# Patient Record
Sex: MALE | Race: WHITE
[De-identification: names, ages, dates, MRNs, and addresses within clinical notes are randomized per-mention and may not be internally consistent; named-entity substitution may affect disease eponyms.]

---

## 2020-11-07 ENCOUNTER — HOSPITAL ENCOUNTER (INPATIENT)
Dept: HOSPITAL 8 - ED | Age: 80
LOS: 7 days | DRG: 329 | End: 2020-11-14
Attending: HOSPITALIST | Admitting: HOSPITALIST
Payer: COMMERCIAL

## 2020-11-07 VITALS — DIASTOLIC BLOOD PRESSURE: 66 MMHG | SYSTOLIC BLOOD PRESSURE: 112 MMHG

## 2020-11-07 VITALS — WEIGHT: 127.21 LBS | HEIGHT: 73 IN | BODY MASS INDEX: 16.86 KG/M2

## 2020-11-07 DIAGNOSIS — M16.10: ICD-10-CM

## 2020-11-07 DIAGNOSIS — Z85.51: ICD-10-CM

## 2020-11-07 DIAGNOSIS — F41.9: ICD-10-CM

## 2020-11-07 DIAGNOSIS — F32.9: ICD-10-CM

## 2020-11-07 DIAGNOSIS — G62.9: ICD-10-CM

## 2020-11-07 DIAGNOSIS — C78.7: ICD-10-CM

## 2020-11-07 DIAGNOSIS — Z87.440: ICD-10-CM

## 2020-11-07 DIAGNOSIS — N17.9: ICD-10-CM

## 2020-11-07 DIAGNOSIS — E87.6: ICD-10-CM

## 2020-11-07 DIAGNOSIS — F10.21: ICD-10-CM

## 2020-11-07 DIAGNOSIS — E03.9: ICD-10-CM

## 2020-11-07 DIAGNOSIS — N39.0: ICD-10-CM

## 2020-11-07 DIAGNOSIS — Z66: ICD-10-CM

## 2020-11-07 DIAGNOSIS — R64: ICD-10-CM

## 2020-11-07 DIAGNOSIS — I10: ICD-10-CM

## 2020-11-07 DIAGNOSIS — F29: ICD-10-CM

## 2020-11-07 DIAGNOSIS — Z79.01: ICD-10-CM

## 2020-11-07 DIAGNOSIS — I73.9: ICD-10-CM

## 2020-11-07 DIAGNOSIS — E78.5: ICD-10-CM

## 2020-11-07 DIAGNOSIS — Z86.718: ICD-10-CM

## 2020-11-07 DIAGNOSIS — C78.6: ICD-10-CM

## 2020-11-07 DIAGNOSIS — Z88.8: ICD-10-CM

## 2020-11-07 DIAGNOSIS — Z87.891: ICD-10-CM

## 2020-11-07 DIAGNOSIS — Z79.02: ICD-10-CM

## 2020-11-07 DIAGNOSIS — Z51.5: ICD-10-CM

## 2020-11-07 DIAGNOSIS — Z79.899: ICD-10-CM

## 2020-11-07 DIAGNOSIS — F03.90: ICD-10-CM

## 2020-11-07 DIAGNOSIS — G93.41: ICD-10-CM

## 2020-11-07 DIAGNOSIS — F10.20: ICD-10-CM

## 2020-11-07 DIAGNOSIS — F20.5: ICD-10-CM

## 2020-11-07 DIAGNOSIS — K56.609: Primary | ICD-10-CM

## 2020-11-07 LAB
ALBUMIN SERPL-MCNC: 2.9 G/DL (ref 3.4–5)
ALP SERPL-CCNC: 75 U/L (ref 45–117)
ALT SERPL-CCNC: 19 U/L (ref 12–78)
ANION GAP SERPL CALC-SCNC: 8 MMOL/L (ref 5–15)
BASOPHILS # BLD AUTO: 0 X10^3/UL (ref 0–0.1)
BASOPHILS NFR BLD AUTO: 1 % (ref 0–1)
BILIRUB SERPL-MCNC: 0.8 MG/DL (ref 0.2–1)
CALCIUM SERPL-MCNC: 9 MG/DL (ref 8.5–10.1)
CHLORIDE SERPL-SCNC: 105 MMOL/L (ref 98–107)
CREAT SERPL-MCNC: 0.98 MG/DL (ref 0.7–1.3)
EOSINOPHIL # BLD AUTO: 0 X10^3/UL (ref 0–0.4)
EOSINOPHIL NFR BLD AUTO: 1 % (ref 1–7)
ERYTHROCYTE [DISTWIDTH] IN BLOOD BY AUTOMATED COUNT: 15.9 % (ref 9.4–14.8)
INR PPP: 1.06 (ref 0.93–1.1)
LYMPHOCYTES # BLD AUTO: 0.6 X10^3/UL (ref 1–3.4)
LYMPHOCYTES NFR BLD AUTO: 10 % (ref 22–44)
MCH RBC QN AUTO: 32.9 PG (ref 27.5–34.5)
MCHC RBC AUTO-ENTMCNC: 33.6 G/DL (ref 33.2–36.2)
MD: NO
MONOCYTES # BLD AUTO: 0.6 X10^3/UL (ref 0.2–0.8)
MONOCYTES NFR BLD AUTO: 11 % (ref 2–9)
NEUTROPHILS # BLD AUTO: 4.7 X10^3/UL (ref 1.8–6.8)
NEUTROPHILS NFR BLD AUTO: 79 % (ref 42–75)
PLATELET # BLD AUTO: 351 X10^3/UL (ref 130–400)
PMV BLD AUTO: 7.8 FL (ref 7.4–10.4)
PROT SERPL-MCNC: 6.5 G/DL (ref 6.4–8.2)
PROTHROMBIN TIME: 11.2 SECONDS (ref 9.6–11.5)
RBC # BLD AUTO: 3.06 X10^6/UL (ref 4.38–5.82)
T4 FREE SERPL-MCNC: 1.59 NG/DL (ref 0.76–1.46)

## 2020-11-07 PROCEDURE — 83735 ASSAY OF MAGNESIUM: CPT

## 2020-11-07 PROCEDURE — 85025 COMPLETE CBC W/AUTO DIFF WBC: CPT

## 2020-11-07 PROCEDURE — 80053 COMPREHEN METABOLIC PANEL: CPT

## 2020-11-07 PROCEDURE — 88342 IMHCHEM/IMCYTCHM 1ST ANTB: CPT

## 2020-11-07 PROCEDURE — 83036 HEMOGLOBIN GLYCOSYLATED A1C: CPT

## 2020-11-07 PROCEDURE — 96372 THER/PROPH/DIAG INJ SC/IM: CPT

## 2020-11-07 PROCEDURE — 0DJD0ZZ INSPECTION OF LOWER INTESTINAL TRACT, OPEN APPROACH: ICD-10-PCS | Performed by: SURGERY

## 2020-11-07 PROCEDURE — 99285 EMERGENCY DEPT VISIT HI MDM: CPT

## 2020-11-07 PROCEDURE — 83690 ASSAY OF LIPASE: CPT

## 2020-11-07 PROCEDURE — 83605 ASSAY OF LACTIC ACID: CPT

## 2020-11-07 PROCEDURE — 84100 ASSAY OF PHOSPHORUS: CPT

## 2020-11-07 PROCEDURE — 0DT80ZZ RESECTION OF SMALL INTESTINE, OPEN APPROACH: ICD-10-PCS | Performed by: SURGERY

## 2020-11-07 PROCEDURE — 84439 ASSAY OF FREE THYROXINE: CPT

## 2020-11-07 PROCEDURE — 81001 URINALYSIS AUTO W/SCOPE: CPT

## 2020-11-07 PROCEDURE — C9113 INJ PANTOPRAZOLE SODIUM, VIA: HCPCS

## 2020-11-07 PROCEDURE — 80061 LIPID PANEL: CPT

## 2020-11-07 PROCEDURE — 96374 THER/PROPH/DIAG INJ IV PUSH: CPT

## 2020-11-07 PROCEDURE — 36415 COLL VENOUS BLD VENIPUNCTURE: CPT

## 2020-11-07 PROCEDURE — 88307 TISSUE EXAM BY PATHOLOGIST: CPT

## 2020-11-07 PROCEDURE — 84443 ASSAY THYROID STIM HORMONE: CPT

## 2020-11-07 PROCEDURE — 88341 IMHCHEM/IMCYTCHM EA ADD ANTB: CPT

## 2020-11-07 PROCEDURE — 87086 URINE CULTURE/COLONY COUNT: CPT

## 2020-11-07 PROCEDURE — 85610 PROTHROMBIN TIME: CPT

## 2020-11-07 RX ADMIN — INSULIN LISPRO SCH NOTE: 100 INJECTION, SOLUTION INTRAVENOUS; SUBCUTANEOUS at 20:30

## 2020-11-07 RX ADMIN — POTASSIUM CHLORIDE SCH MLS/HR: 2 INJECTION, SOLUTION, CONCENTRATE INTRAVENOUS at 19:50

## 2020-11-07 RX ADMIN — POTASSIUM CHLORIDE SCH MLS/HR: 2 INJECTION, SOLUTION, CONCENTRATE INTRAVENOUS at 18:00

## 2020-11-07 RX ADMIN — HEPARIN SODIUM SCH UNITS: 5000 INJECTION, SOLUTION INTRAVENOUS; SUBCUTANEOUS at 19:19

## 2020-11-07 RX ADMIN — PHENAZOPYRIDINE HYDROCHLORIDE SCH MG: 200 TABLET ORAL at 21:00

## 2020-11-07 NOTE — NUR
PATIENT BIB CARE FLIGHT WITH CHIEF C/O SMALL BOWEL OBSTRUCTION.  PER CARE 
FLIGHT RN PATIENT WAS D[facility] [place] [facility] TO INPATIENT [facility] AFTER 
HAVING UTI.  PATIENT STARTED HAVING INCREASING ABDOMINAL PAIN, ALOC, AND 
INCREASED AGITATION. DIAGNOSED TODAY 11/7/2020 WITH SMALL BOWEL OBSTRUCTION. 
PATIENT WAS COVID + PRIOR TO 10/25 AND HAS SINCE TESTED NEGATIVE FOR COVID X2. 
PATIENT HAS 16 [place] NG TUBE IN RIGHT NARE, 22 GAUGE IV RAC AND 20 GAUGE IV 
LFA. PER CARE FLIGHT RN PATIENT'S VITALS STABLE EN ROUTE, RECIEVED 2.5 MG 
ATIVAN EN ROUTE.

## 2020-11-08 VITALS — DIASTOLIC BLOOD PRESSURE: 66 MMHG | SYSTOLIC BLOOD PRESSURE: 101 MMHG

## 2020-11-08 VITALS — DIASTOLIC BLOOD PRESSURE: 67 MMHG | SYSTOLIC BLOOD PRESSURE: 135 MMHG

## 2020-11-08 VITALS — SYSTOLIC BLOOD PRESSURE: 131 MMHG | DIASTOLIC BLOOD PRESSURE: 69 MMHG

## 2020-11-08 VITALS — SYSTOLIC BLOOD PRESSURE: 113 MMHG | DIASTOLIC BLOOD PRESSURE: 78 MMHG

## 2020-11-08 VITALS — DIASTOLIC BLOOD PRESSURE: 57 MMHG | SYSTOLIC BLOOD PRESSURE: 99 MMHG

## 2020-11-08 LAB
<PLATELET ESTIMATE>: ADEQUATE
<PLT MORPHOLOGY>: (no result)
ALBUMIN SERPL-MCNC: 2.5 G/DL (ref 3.4–5)
ALP SERPL-CCNC: 64 U/L (ref 45–117)
ALT SERPL-CCNC: 18 U/L (ref 12–78)
ANION GAP SERPL CALC-SCNC: 8 MMOL/L (ref 5–15)
BAND#(MANUAL): 1.3 X10^3/UL
BILIRUB DIRECT SERPL-MCNC: NORMAL MG/DL
BILIRUB SERPL-MCNC: 0.4 MG/DL (ref 0.2–1)
CALCIUM SERPL-MCNC: 8.3 MG/DL (ref 8.5–10.1)
CHLORIDE SERPL-SCNC: 111 MMOL/L (ref 98–107)
CHOL/HDL RATIO: 2.2
CREAT SERPL-MCNC: 1.24 MG/DL (ref 0.7–1.3)
ERYTHROCYTE [DISTWIDTH] IN BLOOD BY AUTOMATED COUNT: 15.9 % (ref 9.4–14.8)
HDL CHOL %: 45 % (ref 26–37)
HDL CHOLESTEROL (DIRECT): 43 MG/DL (ref 40–60)
LDL CHOLESTEROL,CALCULATED: 41 MG/DL (ref 54–169)
LDLC/HDLC SERPL: 1 {RATIO} (ref 0.5–3)
LYMPH#(MANUAL): 0.12 X10^3/UL (ref 1–3.4)
LYMPHS% (MANUAL): 2 % (ref 22–44)
MCH RBC QN AUTO: 32.7 PG (ref 27.5–34.5)
MCHC RBC AUTO-ENTMCNC: 32.8 G/DL (ref 33.2–36.2)
MD: YES
MICROSCOPIC: (no result)
MONOS#(MANUAL): 0.47 X10^3/UL (ref 0.3–2.7)
MONOS% (MANUAL): 8 % (ref 2–9)
NEUTS BAND NFR BLD: 22 % (ref 0–7)
PLATELET # BLD AUTO: 319 X10^3/UL (ref 130–400)
PMV BLD AUTO: 7.9 FL (ref 7.4–10.4)
PROT SERPL-MCNC: 5.7 G/DL (ref 6.4–8.2)
RBC # BLD AUTO: 3.01 X10^6/UL (ref 4.38–5.82)
SEG#(MANUAL): 4.01 X10^3/UL (ref 1.8–6.8)
SEGS% (MANUAL): 68 % (ref 42–75)
TRIGL SERPL-MCNC: 61 MG/DL (ref 50–200)
VLDLC SERPL CALC-MCNC: 12 MG/DL (ref 0–25)

## 2020-11-08 RX ADMIN — MORPHINE SULFATE PRN MG: 10 INJECTION INTRAVENOUS at 05:43

## 2020-11-08 RX ADMIN — PANTOPRAZOLE SODIUM SCH MG: 40 INJECTION, POWDER, FOR SOLUTION INTRAVENOUS at 08:47

## 2020-11-08 RX ADMIN — MORPHINE SULFATE PRN MG: 10 INJECTION INTRAVENOUS at 21:00

## 2020-11-08 RX ADMIN — INSULIN LISPRO SCH NOTE: 100 INJECTION, SOLUTION INTRAVENOUS; SUBCUTANEOUS at 12:30

## 2020-11-08 RX ADMIN — INSULIN LISPRO SCH NOTE: 100 INJECTION, SOLUTION INTRAVENOUS; SUBCUTANEOUS at 04:30

## 2020-11-08 RX ADMIN — HEPARIN SODIUM SCH UNITS: 5000 INJECTION, SOLUTION INTRAVENOUS; SUBCUTANEOUS at 21:00

## 2020-11-08 RX ADMIN — LINEZOLID SCH MLS/HR: 600 INJECTION, SOLUTION INTRAVENOUS at 02:43

## 2020-11-08 RX ADMIN — POTASSIUM CHLORIDE SCH MLS/HR: 2 INJECTION, SOLUTION, CONCENTRATE INTRAVENOUS at 11:27

## 2020-11-08 RX ADMIN — LINEZOLID SCH MLS/HR: 600 INJECTION, SOLUTION INTRAVENOUS at 14:30

## 2020-11-08 RX ADMIN — MORPHINE SULFATE PRN MG: 10 INJECTION INTRAVENOUS at 01:00

## 2020-11-08 RX ADMIN — HEPARIN SODIUM SCH UNITS: 5000 INJECTION, SOLUTION INTRAVENOUS; SUBCUTANEOUS at 05:38

## 2020-11-08 RX ADMIN — MORPHINE SULFATE PRN MG: 10 INJECTION INTRAVENOUS at 18:08

## 2020-11-08 RX ADMIN — MORPHINE SULFATE PRN MG: 10 INJECTION INTRAVENOUS at 11:28

## 2020-11-08 RX ADMIN — POTASSIUM CHLORIDE SCH MLS/HR: 2 INJECTION, SOLUTION, CONCENTRATE INTRAVENOUS at 21:46

## 2020-11-08 RX ADMIN — MORPHINE SULFATE PRN MG: 10 INJECTION INTRAVENOUS at 01:35

## 2020-11-08 RX ADMIN — PHENAZOPYRIDINE HYDROCHLORIDE SCH MG: 200 TABLET ORAL at 08:45

## 2020-11-08 RX ADMIN — HEPARIN SODIUM SCH UNITS: 5000 INJECTION, SOLUTION INTRAVENOUS; SUBCUTANEOUS at 13:00

## 2020-11-08 RX ADMIN — MORPHINE SULFATE PRN MG: 10 INJECTION INTRAVENOUS at 08:47

## 2020-11-08 RX ADMIN — POTASSIUM CHLORIDE SCH MLS/HR: 2 INJECTION, SOLUTION, CONCENTRATE INTRAVENOUS at 02:43

## 2020-11-09 VITALS — DIASTOLIC BLOOD PRESSURE: 50 MMHG | SYSTOLIC BLOOD PRESSURE: 97 MMHG

## 2020-11-09 VITALS — SYSTOLIC BLOOD PRESSURE: 119 MMHG | DIASTOLIC BLOOD PRESSURE: 60 MMHG

## 2020-11-09 VITALS — SYSTOLIC BLOOD PRESSURE: 120 MMHG | DIASTOLIC BLOOD PRESSURE: 58 MMHG

## 2020-11-09 LAB
<PLATELET ESTIMATE>: ADEQUATE
<PLT MORPHOLOGY>: (no result)
ALBUMIN SERPL-MCNC: 2.3 G/DL (ref 3.4–5)
ALP SERPL-CCNC: 60 U/L (ref 45–117)
ALT SERPL-CCNC: 18 U/L (ref 12–78)
ANION GAP SERPL CALC-SCNC: 4 MMOL/L (ref 5–15)
ANISOCYTOSIS BLD QL SMEAR: (no result)
BAND#(MANUAL): 0.67 X10^3/UL
BILIRUB SERPL-MCNC: 0.3 MG/DL (ref 0.2–1)
CALCIUM SERPL-MCNC: 8.6 MG/DL (ref 8.5–10.1)
CHLORIDE SERPL-SCNC: 117 MMOL/L (ref 98–107)
CREAT SERPL-MCNC: 1 MG/DL (ref 0.7–1.3)
ERYTHROCYTE [DISTWIDTH] IN BLOOD BY AUTOMATED COUNT: 15.9 % (ref 9.4–14.8)
LYMPH#(MANUAL): 0.37 X10^3/UL (ref 1–3.4)
LYMPHS% (MANUAL): 5 % (ref 22–44)
MCH RBC QN AUTO: 32.8 PG (ref 27.5–34.5)
MCHC RBC AUTO-ENTMCNC: 32.7 G/DL (ref 33.2–36.2)
MD: YES
MONOS#(MANUAL): 0.44 X10^3/UL (ref 0.3–2.7)
MONOS% (MANUAL): 6 % (ref 2–9)
NEUTS BAND NFR BLD: 9 % (ref 0–7)
PLATELET # BLD AUTO: 279 X10^3/UL (ref 130–400)
PMV BLD AUTO: 7.4 FL (ref 7.4–10.4)
PROT SERPL-MCNC: 5.5 G/DL (ref 6.4–8.2)
RBC # BLD AUTO: 2.66 X10^6/UL (ref 4.38–5.82)
SEG#(MANUAL): 5.92 X10^3/UL (ref 1.8–6.8)
SEGS% (MANUAL): 80 % (ref 42–75)

## 2020-11-09 RX ADMIN — LORAZEPAM PRN MG: 2 INJECTION INTRAMUSCULAR; INTRAVENOUS at 14:21

## 2020-11-09 RX ADMIN — POTASSIUM CHLORIDE SCH MLS/HR: 2 INJECTION, SOLUTION, CONCENTRATE INTRAVENOUS at 01:44

## 2020-11-09 RX ADMIN — LINEZOLID SCH MLS/HR: 600 INJECTION, SOLUTION INTRAVENOUS at 02:48

## 2020-11-09 RX ADMIN — MORPHINE SULFATE PRN MG: 10 INJECTION INTRAVENOUS at 02:52

## 2020-11-09 RX ADMIN — POTASSIUM CHLORIDE SCH MLS/HR: 2 INJECTION, SOLUTION, CONCENTRATE INTRAVENOUS at 06:05

## 2020-11-09 RX ADMIN — MORPHINE SULFATE PRN MG: 10 INJECTION INTRAVENOUS at 00:04

## 2020-11-09 RX ADMIN — MORPHINE SULFATE PRN MG: 10 INJECTION INTRAVENOUS at 06:06

## 2020-11-09 RX ADMIN — MORPHINE SULFATE PRN MG: 4 INJECTION INTRAVENOUS at 20:39

## 2020-11-09 RX ADMIN — MORPHINE SULFATE PRN MG: 4 INJECTION INTRAVENOUS at 16:11

## 2020-11-09 RX ADMIN — HEPARIN SODIUM SCH UNITS: 5000 INJECTION, SOLUTION INTRAVENOUS; SUBCUTANEOUS at 06:05

## 2020-11-09 RX ADMIN — PANTOPRAZOLE SODIUM SCH MG: 40 INJECTION, POWDER, FOR SOLUTION INTRAVENOUS at 09:48

## 2020-11-09 RX ADMIN — LORAZEPAM PRN MG: 2 INJECTION INTRAMUSCULAR; INTRAVENOUS at 17:08

## 2020-11-10 RX ADMIN — SCOLOPAMINE TRANSDERMAL SYSTEM PRN PATCH: 1 PATCH, EXTENDED RELEASE TRANSDERMAL at 22:47

## 2020-11-10 RX ADMIN — LORAZEPAM PRN MG: 2 INJECTION INTRAMUSCULAR; INTRAVENOUS at 22:44

## 2020-11-10 RX ADMIN — LORAZEPAM PRN MG: 2 INJECTION INTRAMUSCULAR; INTRAVENOUS at 06:25

## 2020-11-10 RX ADMIN — MORPHINE SULFATE PRN MG: 1 INJECTION INTRAVENOUS at 18:34

## 2020-11-10 RX ADMIN — MORPHINE SULFATE PRN MG: 1 INJECTION INTRAVENOUS at 09:54

## 2020-11-10 RX ADMIN — MORPHINE SULFATE PRN MG: 4 INJECTION INTRAVENOUS at 00:19

## 2020-11-10 RX ADMIN — MORPHINE SULFATE PRN MG: 4 INJECTION INTRAVENOUS at 05:00

## 2020-11-11 RX ADMIN — MORPHINE SULFATE PRN MG: 1 INJECTION INTRAVENOUS at 00:19

## 2020-11-11 RX ADMIN — MORPHINE SULFATE PRN MG: 1 INJECTION INTRAVENOUS at 10:51

## 2020-11-11 RX ADMIN — MORPHINE SULFATE PRN MG: 1 INJECTION INTRAVENOUS at 21:41

## 2020-11-11 RX ADMIN — MORPHINE SULFATE PRN MG: 1 INJECTION INTRAVENOUS at 05:25

## 2020-11-11 RX ADMIN — MORPHINE SULFATE PRN MG: 1 INJECTION INTRAVENOUS at 15:51

## 2020-11-11 RX ADMIN — LORAZEPAM PRN MG: 2 INJECTION INTRAMUSCULAR; INTRAVENOUS at 05:24

## 2020-11-12 RX ADMIN — MORPHINE SULFATE PRN MG: 1 INJECTION INTRAVENOUS at 22:46

## 2020-11-12 RX ADMIN — MORPHINE SULFATE PRN MG: 1 INJECTION INTRAVENOUS at 02:35

## 2020-11-12 RX ADMIN — MORPHINE SULFATE PRN MG: 1 INJECTION INTRAVENOUS at 12:24

## 2020-11-12 RX ADMIN — MORPHINE SULFATE PRN MG: 1 INJECTION INTRAVENOUS at 17:20

## 2020-11-12 RX ADMIN — MORPHINE SULFATE PRN MG: 1 INJECTION INTRAVENOUS at 07:27

## 2020-11-13 RX ADMIN — MORPHINE SULFATE PRN MLS/HR: 10 INJECTION INTRAVENOUS at 20:28

## 2020-11-13 RX ADMIN — MORPHINE SULFATE PRN MLS/HR: 10 INJECTION INTRAVENOUS at 04:18

## 2020-11-14 RX ADMIN — MORPHINE SULFATE PRN MLS/HR: 10 INJECTION INTRAVENOUS at 13:08

## 2020-11-14 RX ADMIN — SCOLOPAMINE TRANSDERMAL SYSTEM PRN PATCH: 1 PATCH, EXTENDED RELEASE TRANSDERMAL at 11:04
